# Patient Record
Sex: FEMALE | Race: BLACK OR AFRICAN AMERICAN | URBAN - METROPOLITAN AREA
[De-identification: names, ages, dates, MRNs, and addresses within clinical notes are randomized per-mention and may not be internally consistent; named-entity substitution may affect disease eponyms.]

---

## 2018-06-04 ENCOUNTER — OFFICE VISIT (OUTPATIENT)
Dept: FAMILY MEDICINE CLINIC | Age: 23
End: 2018-06-04

## 2018-06-04 VITALS
DIASTOLIC BLOOD PRESSURE: 89 MMHG | SYSTOLIC BLOOD PRESSURE: 135 MMHG | HEART RATE: 63 BPM | TEMPERATURE: 98.8 F | HEIGHT: 69 IN | WEIGHT: 188.8 LBS | RESPIRATION RATE: 18 BRPM | OXYGEN SATURATION: 100 % | BODY MASS INDEX: 27.96 KG/M2

## 2018-06-04 DIAGNOSIS — R73.09 ELEVATED HEMOGLOBIN A1C: ICD-10-CM

## 2018-06-04 DIAGNOSIS — R03.0 ELEVATED BLOOD PRESSURE READING: ICD-10-CM

## 2018-06-04 DIAGNOSIS — Z76.89 ESTABLISHING CARE WITH NEW DOCTOR, ENCOUNTER FOR: Primary | ICD-10-CM

## 2018-06-04 DIAGNOSIS — E66.3 OVERWEIGHT (BMI 25.0-29.9): ICD-10-CM

## 2018-06-04 DIAGNOSIS — Z00.00 WELL WOMAN EXAM (NO GYNECOLOGICAL EXAM): ICD-10-CM

## 2018-06-04 NOTE — PATIENT INSTRUCTIONS

## 2018-06-04 NOTE — PROGRESS NOTES
Chief Complaint   Patient presents with   Albin Barragan Establish Care     Patient here to establish care with provider. 1. Have you been to the ER, urgent care clinic since your last visit? Hospitalized since your last visit? Yes When: Last week [facility], hurt foot    2. Have you seen or consulted any other health care providers outside of the [facility] since your last visit? Include any pap smears or colon screening.  No

## 2018-06-04 NOTE — PROGRESS NOTES
[facility] Note      Subjective:     Chief Complaint   Patient presents with   [address]     Patient here to establish care with provider. Jay Lomax is a 21y.o. year old female who presents for evaluation of the following:    Establishment of Care:  Previous PCP: Dr. Jeanne Ryan (states she would like to change PCP but no reason given)  Care Team:   Dentist- seen last > 1 year ago, Rojas Plunkett appt this month  Optho- seen last 2017  GYN: Dr. Maximo Anderson  Allergist: Dr. Buck Langford    PMH:  Asthma/ Perineal Allergeis: Tx: daily inhaler used daily + albuterol + allegera + Epipen  Albuterol use- non in past 2 weeks  Hospitalized in childhood  No intubation  Managed by allergy specialist.     Heavy Periods/ Contraception  Tx: OCP  Improved. 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 Non smoker  Prevnar @65: Not due  Shingles @60: Not due    Colonoscopy @50: No family history   ASA @ 55f and 45m:  Not due. Dexa @65: Not due    HIV or other STD testing: Declined, done with GYN  Domestic Violence Screen: negative  Depression Screen: Denies depression or anhedonia       Pap:  Last 4/2018, NIL with GYN  Mammogram: No family history  Patient's last menstrual period was 05/30/2018 (exact date). Menses Monthly. No recent worsening bleeding or intermenstrual bleeding   reports that she currently engages in sexual activity and has had male partners. She reports using the following method of birth control/protection: Pill. Review of Systems   Pertinent positives and negative per HPI.  All other systems  reviewed are negative for a Comprehensive ROS (10+). Past Medical History:   Diagnosis Date    Asthma         Social History     Social History    Marital status: SINGLE     Spouse name: N/A    Number of children: N/A    Years of education: N/A     Occupational History    Not on file. Social History Main Topics    Smoking status: Never Smoker    Smokeless tobacco: Never Used    Alcohol use 0.0 oz/week     0 Standard drinks or equivalent per week      Comment: 2-4 drinks every other weekend    Drug use: No    Sexual activity: Yes     Partners: Male     Birth control/ protection: Pill     Other Topics Concern    Not on file     Social History Narrative       Current Outpatient Prescriptions   Medication Sig    fluticasone propionate (FLONASE NA) by Nasal route.  fexofenadine (ALLEGRA) 180 mg tablet Take 1 Tab by mouth nightly.  GIANVI, 28, 3-0.02 mg per tablet Take 1 Tab by mouth daily.  albuterol (PROAIR HFA) 90 mcg/actuation inhaler Take 2 Puffs by inhalation every four (4) hours as needed for Wheezing. No current facility-administered medications for this visit. Objective:     Vitals:    06/04/18 1511 06/04/18 1543   BP: (!) 150/95 135/89   Pulse: 66 63   Resp: 18    Temp: 98.8 °F (37.1 °C)    TempSrc: Oral    SpO2: 100%    Weight: 188 lb 12.8 oz (85.6 kg)    Height: 5' 8.5\" (1.74 m)        Physical Examination:  General: Alert, cooperative, no distress, appears stated age. Eyes: Conjunctivae clear. PERRL, EOMs intact. Ears: Normal external ear canals both ears. TM clear and mobile bilaterally  Nose: Nares normal. Septum midline. Mucosa normal. No drainage or sinus tenderness. Mouth/Throat: Lips, mucosa, and tongue normal. Teeth and gums normal.  Neck: Supple, symmetrical, trachea midline, no adenopathy. No thyroid enlargement/tenderness/nodules  Back: Symmetric, no curvature. ROM normal. No CVA tenderness. Lungs: Clear to auscultation bilaterally.  Normal inspiratory and expiratory ratio. Heart: Regular rate and rhythm, S1, S2 normal, no murmur, click, rub or gallop. Abdomen: Soft, non-tender. Bowel sounds normal. No masses or organomegaly. Extremities: Extremities normal, atraumatic, no cyanosis or edema. Pulses: 2+ and symmetric all extremities. Skin: Skin color, texture, turgor normal. No rashes or lesions on exposed skin. Lymph nodes: Cervical, supraclavicular nodes normal.  Neurologic: CNII-XII intact. Strength 5/5 grossly. Sensation and reflexes normal throughout. No visits with results within 3 Month(s) from this visit. Latest known visit with results is:    Office Visit on 06/16/2015   Component Date Value Ref Range Status    WBC 06/16/2015 4.5  3.4 - 10.8 x10E3/uL Final    RBC 06/16/2015 4.16  3.77 - 5.28 x10E6/uL Final    HGB 06/16/2015 11.3  11.1 - 15.9 g/dL Final    HCT 06/16/2015 34.1  34.0 - 46.6 % Final    MCV 06/16/2015 82  79 - 97 fL Final    MCH 06/16/2015 27.2  26.6 - 33.0 pg Final    MCHC 06/16/2015 33.1  31.5 - 35.7 g/dL Final    RDW 06/16/2015 13.7  12.3 - 15.4 % Final    PLATELET 50/54/0710 174  150 - 379 x10E3/uL Final    NEUTROPHILS 06/16/2015 49  % Final    Lymphocytes 06/16/2015 43  % Final    MONOCYTES 06/16/2015 6  % Final    EOSINOPHILS 06/16/2015 2  % Final    BASOPHILS 06/16/2015 0  % Final    ABS. NEUTROPHILS 06/16/2015 2.2  1.4 - 7.0 x10E3/uL Final    Abs Lymphocytes 06/16/2015 1.9  0.7 - 3.1 x10E3/uL Final    ABS. MONOCYTES 06/16/2015 0.3  0.1 - 0.9 x10E3/uL Final    ABS. EOSINOPHILS 06/16/2015 0.1  0.0 - 0.4 x10E3/uL Final    ABS. BASOPHILS 06/16/2015 0.0  0.0 - 0.2 x10E3/uL Final    IMMATURE GRANULOCYTES 06/16/2015 0  % Final    ABS. IMM.  GRANS. 06/16/2015 0.0  0.0 - 0.1 x10E3/uL Final    Glucose 06/16/2015 73  65 - 99 mg/dL Final    BUN 06/16/2015 8  6 - 20 mg/dL Final    Creatinine 06/16/2015 1.01* 0.57 - 1.00 mg/dL Final    GFR est non-AA 06/16/2015 80  >59 mL/min/1.73 Final    GFR est AA 06/16/2015 93  >59 mL/min/1.73 Final    BUN/Creatinine ratio 06/16/2015 8  8 - 20 Final    Sodium 06/16/2015 144  134 - 144 mmol/L Final    Potassium 06/16/2015 4.1  3.5 - 5.2 mmol/L Final    Chloride 06/16/2015 105  97 - 108 mmol/L Final    CO2 06/16/2015 22  18 - 29 mmol/L Final    Calcium 06/16/2015 9.2  8.7 - 10.2 mg/dL Final    Protein, total 06/16/2015 6.8  6.0 - 8.5 g/dL Final    Albumin 06/16/2015 4.3  3.5 - 5.5 g/dL Final    GLOBULIN, TOTAL 06/16/2015 2.5  1.5 - 4.5 g/dL Final    A-G Ratio 06/16/2015 1.7  1.1 - 2.5 Final    Bilirubin, total 06/16/2015 0.2  0.0 - 1.2 mg/dL Final    Alk. phosphatase 06/16/2015 41  39 - 117 IU/L Final    AST (SGOT) 06/16/2015 12  0 - 40 IU/L Final    ALT (SGPT) 06/16/2015 8  0 - 32 IU/L Final    Hemoglobin A1c 06/16/2015 5.7* 4.8 - 5.6 % Final    Comment:          Increased risk for diabetes: 5.7 - 6.4           Diabetes: >6.4           Glycemic control for adults with diabetes: <7.0      TSH 06/16/2015 1.610  0.450 - 4.500 uIU/mL Final    Specific Gravity 06/16/2015 1.015  1.005 - 1.030 Final    pH (UA) 06/16/2015 6.5  5.0 - 7.5 Final    Color 06/16/2015 Yellow  Yellow Final    Appearance 06/16/2015 Clear  Clear Final    Leukocyte Esterase 06/16/2015 3+* Negative Final    Protein 06/16/2015 Negative  Negative/Trace Final    Glucose 06/16/2015 Negative  Negative Final    Ketone 06/16/2015 Negative  Negative Final    Blood 06/16/2015 Negative  Negative Final    Bilirubin 06/16/2015 Negative  Negative Final    Urobilinogen 06/16/2015 0.2  0.0 - 1.9 mg/dL Final    Nitrites 06/16/2015 Negative  Negative Final    Microscopic Examination 06/16/2015 See additional order   Final    Microscopic was indicated and was performed.  WBC 06/16/2015 11-30* 0 - 5 /hpf Final    RBC 06/16/2015 0-2  0 - 2 /hpf Final    Epithelial cells 06/16/2015 0-10  0 - 10 /hpf Final    Mucus 06/16/2015 Present  Not Estab.  Final    Bacteria 06/16/2015 Few  None seen/Few Final           Assessment/ Plan:   Diagnoses and all orders for this visit:    1. Establishing care with new doctor, encounter for    2. Well woman exam (no gynecological exam)  -     METABOLIC PANEL, COMPREHENSIVE  -     CBC WITH AUTOMATED DIFF    3. Overweight (BMI 25.0-29.9)    4. Moderate intermittent asthma without complication    5. Elevated hemoglobin A1c  -     HEMOGLOBIN A1C WITH EAG    6. Elevated blood pressure reading      Previous epic records reviewed. Allergies records requested. Doing well overall  Abnormal findings discussed below. STI screen declined. Labs to eval end organ function today  No Tdap on file but patient reports vaccines up to date  Pap with GYN  Diet and lifestyle modification encouraged for weight loss. Noted 27 pound weight gain since last visit 2 years ago. Moderate intermittent asthma per history but new daily inhaler (name unknown) suggestive of persistent asthma. Specialist records requested for clarity. Noted previous hemoglobin A1c 5.7% in 2015 suggestive of prediabetes. Monitor with repeat labs today. Noted initial elevated blood pressure reading. Repeat less than 140/90. Noted with family history and mother have hypertension patient at high risk of developing hypertension. Low salt diet encouraged (less tacos)       I have discussed the diagnosis with the patient and the intended plan as seen in the above orders. The patient has received an after-visit summary and questions were answered concerning future plans. I have discussed medication side effects and warnings with the patient as well. Follow-up Disposition:  Return in about 6 months (around 12/4/2018) for Follow Up.       Signed,    Fernando Fontaine MD  6/4/2018

## 2018-06-04 NOTE — MR AVS SNAPSHOT
[address] [place] [address] 
[phone] Patient: Preston San MRN: [id number] SXI:0/5/5202 Visit Information Date & Time Provider Department Dept. Phone Encounter #  
 6/4/2018  3:00 PM Sarthak Lopes  [facility] [phone] [id number] Follow-up Instructions Return in about 6 months (around 12/4/2018) for Follow Up. Upcoming Health Maintenance Date Due DTaP/Tdap/Td series (7 - Td) 7/12/2016 Influenza Age 5 to Adult 8/1/2018 PAP AKA CERVICAL CYTOLOGY 4/30/2021 Allergies as of 6/4/2018  Review Complete On: 6/4/2018 By: Mercedes Mena LPN No Known Allergies Current Immunizations  Reviewed on 5/30/2018 Name Date DTaP 4/7/1999, 11/26/1996, 1995, 1995, 1995 HPV 5/28/2010, 3/8/2010, 7/28/2009 Hep A Vaccine 5/28/2010, 4/20/2009 Hep B Vaccine 1/11/1996, 1995, 1995 Hib 4/9/1996, 1995, 1995, 1995 Influenza Vaccine 9/20/2014, 9/24/2012, 1/18/2012, 10/19/2009, 11/6/2008, 10/13/2007, 10/18/2005, 10/7/2004, 11/26/2003, 10/27/2003, 10/13/2001 MMR 4/7/1999, 10/15/1996 Meningococcal ACWY Vaccine 5/17/2012, 7/16/2007 OPV 4/7/1999, 1995, 1995, 1995 Pertussis Vaccine 7/12/2006 Pneumococcal Polysaccharide (PPSV-23) 7/2/2015 TB Skin Test (PPD) 1/16/2012, 4/25/2005, 4/13/2000, 9/7/1999 Td 7/12/2006 Tdap 7/12/2006 Varicella Virus Vaccine 5/23/2008, 5/23/2005, 10/15/1996 Not reviewed this visit You Were Diagnosed With   
  
 Codes Comments Establishing care with new doctor, encounter for    -  Primary ICD-10-CM: Z76.89 
ICD-9-CM: V65.8 Overweight (BMI 25.0-29. 9)     ICD-10-CM: O30.9 ICD-9-CM: 278.02 Well woman exam (no gynecological exam)     ICD-10-CM: Z00.00 ICD-9-CM: V70.0 Elevated hemoglobin A1c     ICD-10-CM: R73.09 
ICD-9-CM: 790.29 Vitals BP Pulse Temp Resp Height(growth percentile) Weight(growth percentile) (!) 150/95 (BP 1 Location: Left arm, BP Patient Position: Sitting) 66 98.8 °F (37.1 °C) (Oral) 18 5' 8.5\" (1.74 m) 188 lb 12.8 oz (85.6 kg) LMP SpO2 BMI OB Status Smoking Status 05/30/2018 (Exact Date) 100% 28.29 kg/m2 Having regular periods Never Smoker BMI and BSA Data Body Mass Index Body Surface Area  
 28.29 kg/m 2 2.03 m 2 Preferred Pharmacy Pharmacy Name Phone [facility] [address], [address] [phone] Your Updated Medication List  
  
   
This list is accurate as of 6/4/18  3:32 PM.  Always use your most recent med list.  
  
  
  
  
 fexofenadine 180 mg tablet Commonly known as:  Bria Chick Take 1 Tab by mouth nightly. FLONASE NA  
by Nasal route. GIANVI (28) 3-0.02 mg Tab Generic drug:  drospirenone-ethinyl estradiol Take 1 Tab by mouth daily. PROAIR HFA 90 mcg/actuation inhaler Generic drug:  albuterol Take 2 Puffs by inhalation every four (4) hours as needed for Wheezing. We Performed the Following CBC WITH AUTOMATED DIFF [31687 CPT(R)] HEMOGLOBIN A1C WITH EAG [33426 CPT(R)] METABOLIC PANEL, COMPREHENSIVE [95053 CPT(R)] Follow-up Instructions Return in about 6 months (around 12/4/2018) for Follow Up. Patient Instructions Well Visit, Ages 25 to 48: Care Instructions Your Care Instructions Physical exams can help you stay healthy. Your doctor has checked your overall health and may have suggested ways to take good care of yourself. He or she also may have recommended tests. At home, you can help prevent illness with healthy eating, regular exercise, and other steps. Follow-up care is a key part of your treatment and safety.  Be sure to make and go to all appointments, and call your doctor if you are having problems. It's also a good idea to know your test results and keep a list of the medicines you take. How can you care for yourself at home? · Reach and stay at a healthy weight. This will lower your risk for many problems, such as obesity, diabetes, heart disease, and high blood pressure. · Get at least 30 minutes of physical activity on most days of the week. Walking is a good choice. You also may want to do other activities, such as running, swimming, cycling, or playing tennis or team sports. Discuss any changes in your exercise program with your doctor. · Do not smoke or allow others to smoke around you. If you need help quitting, talk to your doctor about stop-smoking programs and medicines. These can increase your chances of quitting for good. · Talk to your doctor about whether you have any risk factors for sexually transmitted infections (STIs). Having one sex partner (who does not have STIs and does not have sex with anyone else) is a good way to avoid these infections. · Use birth control if you do not want to have children at this time. Talk with your doctor about the choices available and what might be best for you. · Protect your skin from too much sun. When you're outdoors from 10 a.m. to 4 p.m., stay in the shade or cover up with clothing and a hat with a wide brim. Wear sunglasses that block UV rays. Even when it's cloudy, put broad-spectrum sunscreen (SPF 30 or higher) on any exposed skin. · See a dentist one or two times a year for checkups and to have your teeth cleaned. · Wear a seat belt in the car. · Drink alcohol in moderation, if at all. That means no more than 2 drinks a day for men and 1 drink a day for women. Follow your doctor's advice about when to have certain tests. These tests can spot problems early. For everyone · Cholesterol. Have the fat (cholesterol) in your blood tested after age 21.  Your doctor will tell you how often to have this done based on your age, family history, or other things that can increase your risk for heart disease. · Blood pressure. Have your blood pressure checked during a routine doctor visit. Your doctor will tell you how often to check your blood pressure based on your age, your blood pressure results, and other factors. · Vision. Talk with your doctor about how often to have a glaucoma test. 
· Diabetes. Ask your doctor whether you should have tests for diabetes. · Colon cancer. Have a test for colon cancer at age 48. You may have one of several tests. If you are younger than 48, you may need a test earlier if you have any risk factors. Risk factors include whether you already had a precancerous polyp removed from your colon or whether your parent, brother, sister, or child has had colon cancer. For women · Breast exam and mammogram. Talk to your doctor about when you should have a clinical breast exam and a mammogram. Medical experts differ on whether and how often women under 50 should have these tests. Your doctor can help you decide what is right for you. · Pap test and pelvic exam. Begin Pap tests at age 24. A Pap test is the best way to find cervical cancer. The test often is part of a pelvic exam. Ask how often to have this test. 
· Tests for sexually transmitted infections (STIs). Ask whether you should have tests for STIs. You may be at risk if you have sex with more than one person, especially if your partners do not wear condoms. For men · Tests for sexually transmitted infections (STIs). Ask whether you should have tests for STIs. You may be at risk if you have sex with more than one person, especially if you do not wear a condom. · Testicular cancer exam. Ask your doctor whether you should check your testicles regularly. · Prostate exam. Talk to your doctor about whether you should have a blood test (called a PSA test) for prostate cancer.  Experts differ on whether and when men should have this test. Some experts suggest it if you are older than 39 and are -American or have a father or brother who got prostate cancer when he was younger than 72. When should you call for help? Watch closely for changes in your health, and be sure to contact your doctor if you have any problems or symptoms that concern you. Where can you learn more? Go to http://susan-isha.info/. Enter P072 in the search box to learn more about \"Well Visit, Ages 25 to 48: Care Instructions. \" Current as of: May 12, 2017 Content Version: 11.4 © 6485-6772 "[employer]". Care instructions adapted under license by [employer] (which disclaims liability or warranty for this information). If you have questions about a medical condition or this instruction, always ask your healthcare professional. [address] any warranty or liability for your use of this information. Introducing [facility]! Dear Nixon Segura: Thank you for requesting a Mobeon account. Our records indicate that you already have an active Mobeon account. You can access your account anytime at [email or web address] Did you know that you can access your hospital and ER discharge instructions at any time in Mobeon? You can also review all of your test results from your hospital stay or ER visit. Additional Information If you have questions, please visit the Frequently Asked Questions section of the Mobeon website at [email or web address] Remember, Mobeon is NOT to be used for urgent needs. For medical emergencies, dial 911. Now available from your iPhone and Android! Please provide this summary of care documentation to your next provider. Your primary care clinician is listed as Breana Cordon. If you have any questions after today's visit, please call [phone].

## 2018-06-04 NOTE — LETTER
6/11/2018 4:58 PM 
 
Ms. Aroldo Murphy [address] [address] [facility] [address] [facility] [address] Dear Aroldo Murphy: 
 
Please find your most recent results below. Resulted Orders METABOLIC PANEL, COMPREHENSIVE Result Value Ref Range Glucose 111 (H) 65 - 99 mg/dL BUN 9 6 - 20 mg/dL Creatinine 0.71 0.57 - 1.00 mg/dL GFR est non- >59 mL/min/1.73 GFR est  >59 mL/min/1.73  
 BUN/Creatinine ratio 13 9 - 23 Sodium 141 134 - 144 mmol/L Potassium 4.1 3.5 - 5.2 mmol/L Chloride 103 96 - 106 mmol/L  
 CO2 27 18 - 29 mmol/L Comment: **Effective June 11, 2018 Carbon Dioxide, Total** 
  reference interval will be changing to: Age                  Male          Female 
     0 days   - 30 days         16 - 34        16 - 34 
    31 days   -  1 year         15 - 22        15 - 25 
     2 years  -  5 years        16 - 34        14 - 32 
     6 years  - 15 years        23 - 32        22 - 32 
               >12 years        21 - 32        18 - 34 Calcium 9.1 8.7 - 10.2 mg/dL Protein, total 7.2 6.0 - 8.5 g/dL Albumin 4.2 3.5 - 5.5 g/dL GLOBULIN, TOTAL 3.0 1.5 - 4.5 g/dL A-G Ratio 1.4 1.2 - 2.2 Bilirubin, total <0.2 0.0 - 1.2 mg/dL Alk. phosphatase 48 39 - 117 IU/L  
 AST (SGOT) 19 0 - 40 IU/L  
 ALT (SGPT) 14 0 - 32 IU/L Narrative Performed at:  [address]  [id number] : Sadie Flowers MD, Phone:  [phone] CBC WITH AUTOMATED DIFF Result Value Ref Range WBC 4.3 3.4 - 10.8 x10E3/uL  
 RBC 4.37 3.77 - 5.28 x10E6/uL HGB 12.2 11.1 - 15.9 g/dL HCT 36.1 34.0 - 46.6 % MCV 83 79 - 97 fL  
 MCH 27.9 26.6 - 33.0 pg  
 MCHC 33.8 31.5 - 35.7 g/dL  
 RDW 13.1 12.3 - 15.4 % PLATELET 171 843 - 604 x10E3/uL NEUTROPHILS 37 Not Estab. % Lymphocytes 51 Not Estab. % MONOCYTES 4 Not Estab. % EOSINOPHILS 7 Not Estab. %  BASOPHILS 1 Not Estab. %  
 ABS. NEUTROPHILS 1.6 1.4 - 7.0 x10E3/uL Abs Lymphocytes 2.2 0.7 - 3.1 x10E3/uL  
 ABS. MONOCYTES 0.2 0.1 - 0.9 x10E3/uL  
 ABS. EOSINOPHILS 0.3 0.0 - 0.4 x10E3/uL  
 ABS. BASOPHILS 0.0 0.0 - 0.2 x10E3/uL IMMATURE GRANULOCYTES 0 Not Estab. %  
 ABS. IMM. GRANS. 0.0 0.0 - 0.1 x10E3/uL Narrative Performed at:  [address]  [id number] : Lissa Feldman MD, Phone:  [phone] HEMOGLOBIN A1C WITH EAG Result Value Ref Range Hemoglobin A1c 5.1 4.8 - 5.6 % Comment:  
            Pre-diabetes: 5.7 - 6.4 Diabetes: >6.4 Glycemic control for adults with diabetes: <7.0 Estimated average glucose 100 mg/dL Narrative Performed at:  [address]  [id number] : Lissa Feldman MD, Phone:  [phone] RECOMMENDATIONS: 
All of your test results look good. Please call me if you have any questions: [phone] Sincerely, Marlyn Yousif MD

## 2018-06-05 LAB
ALBUMIN SERPL-MCNC: 4.2 G/DL (ref 3.5–5.5)
ALBUMIN/GLOB SERPL: 1.4 {RATIO} (ref 1.2–2.2)
ALP SERPL-CCNC: 48 IU/L (ref 39–117)
ALT SERPL-CCNC: 14 IU/L (ref 0–32)
AST SERPL-CCNC: 19 IU/L (ref 0–40)
BASOPHILS # BLD AUTO: 0 X10E3/UL (ref 0–0.2)
BASOPHILS NFR BLD AUTO: 1 %
BILIRUB SERPL-MCNC: <0.2 MG/DL (ref 0–1.2)
BUN SERPL-MCNC: 9 MG/DL (ref 6–20)
BUN/CREAT SERPL: 13 (ref 9–23)
CALCIUM SERPL-MCNC: 9.1 MG/DL (ref 8.7–10.2)
CHLORIDE SERPL-SCNC: 103 MMOL/L (ref 96–106)
CO2 SERPL-SCNC: 27 MMOL/L (ref 18–29)
CREAT SERPL-MCNC: 0.71 MG/DL (ref 0.57–1)
EOSINOPHIL # BLD AUTO: 0.3 X10E3/UL (ref 0–0.4)
EOSINOPHIL NFR BLD AUTO: 7 %
ERYTHROCYTE [DISTWIDTH] IN BLOOD BY AUTOMATED COUNT: 13.1 % (ref 12.3–15.4)
EST. AVERAGE GLUCOSE BLD GHB EST-MCNC: 100 MG/DL
GFR SERPLBLD CREATININE-BSD FMLA CKD-EPI: 120 ML/MIN/1.73
GFR SERPLBLD CREATININE-BSD FMLA CKD-EPI: 139 ML/MIN/1.73
GLOBULIN SER CALC-MCNC: 3 G/DL (ref 1.5–4.5)
GLUCOSE SERPL-MCNC: 111 MG/DL (ref 65–99)
HBA1C MFR BLD: 5.1 % (ref 4.8–5.6)
HCT VFR BLD AUTO: 36.1 % (ref 34–46.6)
HGB BLD-MCNC: 12.2 G/DL (ref 11.1–15.9)
IMM GRANULOCYTES # BLD: 0 X10E3/UL (ref 0–0.1)
IMM GRANULOCYTES NFR BLD: 0 %
LYMPHOCYTES # BLD AUTO: 2.2 X10E3/UL (ref 0.7–3.1)
LYMPHOCYTES NFR BLD AUTO: 51 %
MCH RBC QN AUTO: 27.9 PG (ref 26.6–33)
MCHC RBC AUTO-ENTMCNC: 33.8 G/DL (ref 31.5–35.7)
MCV RBC AUTO: 83 FL (ref 79–97)
MONOCYTES # BLD AUTO: 0.2 X10E3/UL (ref 0.1–0.9)
MONOCYTES NFR BLD AUTO: 4 %
NEUTROPHILS # BLD AUTO: 1.6 X10E3/UL (ref 1.4–7)
NEUTROPHILS NFR BLD AUTO: 37 %
PLATELET # BLD AUTO: 343 X10E3/UL (ref 150–379)
POTASSIUM SERPL-SCNC: 4.1 MMOL/L (ref 3.5–5.2)
PROT SERPL-MCNC: 7.2 G/DL (ref 6–8.5)
RBC # BLD AUTO: 4.37 X10E6/UL (ref 3.77–5.28)
SODIUM SERPL-SCNC: 141 MMOL/L (ref 134–144)
WBC # BLD AUTO: 4.3 X10E3/UL (ref 3.4–10.8)

## 2018-06-11 NOTE — PROGRESS NOTES
Unable to reach patient via telephone. 3rd attempt. Phone always rings busy. Letter printed with results.

## 2018-10-29 ENCOUNTER — CLINICAL SUPPORT (OUTPATIENT)
Dept: FAMILY MEDICINE CLINIC | Age: 23
End: 2018-10-29

## 2018-10-29 DIAGNOSIS — Z23 ENCOUNTER FOR IMMUNIZATION: Primary | ICD-10-CM

## 2018-10-29 NOTE — PROGRESS NOTES
Chief Complaint   Patient presents with    Immunization/Injection     1. Have you been to the ER, urgent care clinic since your last visit? Hospitalized since your last visit? No    2. Have you seen or consulted any other health care providers outside of the [address] [place] [address] since your last visit? Include any pap smears or colon screening.  Yes    Dr. Darwin Urena allergist 10/3/18 approximately

## 2018-10-31 ENCOUNTER — OFFICE VISIT (OUTPATIENT)
Dept: FAMILY MEDICINE CLINIC | Age: 23
End: 2018-10-31

## 2018-10-31 VITALS
TEMPERATURE: 98.5 F | RESPIRATION RATE: 18 BRPM | OXYGEN SATURATION: 100 % | BODY MASS INDEX: 29 KG/M2 | HEIGHT: 69 IN | HEART RATE: 82 BPM | DIASTOLIC BLOOD PRESSURE: 72 MMHG | WEIGHT: 195.8 LBS | SYSTOLIC BLOOD PRESSURE: 118 MMHG

## 2018-10-31 DIAGNOSIS — E66.3 OVERWEIGHT (BMI 25.0-29.9): ICD-10-CM

## 2018-10-31 DIAGNOSIS — Z11.1 SCREENING FOR TUBERCULOSIS: Primary | ICD-10-CM

## 2018-10-31 DIAGNOSIS — L20.82 FLEXURAL ECZEMA: ICD-10-CM

## 2018-10-31 DIAGNOSIS — R03.0 ELEVATED BLOOD PRESSURE READING: ICD-10-CM

## 2018-10-31 DIAGNOSIS — J30.9 ALLERGIC RHINITIS, UNSPECIFIED SEASONALITY, UNSPECIFIED TRIGGER: ICD-10-CM

## 2018-10-31 RX ORDER — TRIAMCINOLONE ACETONIDE 1 MG/G
CREAM TOPICAL 2 TIMES DAILY
Qty: 15 G | Refills: 0 | Status: SHIPPED | OUTPATIENT
Start: 2018-10-31

## 2018-10-31 NOTE — PATIENT INSTRUCTIONS
Weight Loss Tips:    Remember this is like a part time job so your motivation and commitment is key to your success. Use small plate only  Drink 2 liters (1/2 gallon) of water every day  1/2 of every meal is fruit or vegetable  Try meal prepping on Sunday with new different vegetables. Replace soda with diet soda or other zero sugar drinks (selter water just fine)  Start using the [employer] [facility] breanne for calorie counting. Goal 1800 calories per day  Start work out at least 5 days per week for 40 minutes. Consider GFRANQ training breanne for home exercises. For your symptoms: Your symptoms may improve with an oral antihistamine. These are available over the counter and include:  Loratadine/claritin  Cetirizine/Zyrtec  Fexofenadine/Allegra  Levocetirizine/Xyzal    · Your symptoms may improve with a nasal steroid. These are available over the counter and include:  · Flonase (aka fluticasone)  · Nasocort (aka triamcinolone)  · Nasonex (aka mometasone)  · Rhinocort (aka budesonide)    · Increase fluid intake, especially water to thin mucous and boost the immune system. · Avoid sugar and dairy while congested since they thicken mucous. · Get plenty of rest!    · Gargle 3 times daily and as needed in Listerine or warm salt water vinegar solutions (1 tsp salt, 1 tsp vinegar in 1 cup lukewarm water.)    · Use OTC nasal saline spray up each nostril four times daily. You could also consider using a netipot with distilled water. · Use humidifier at bedtime. · Use OTC Mucinex 600 mg twice daily to loosen mucous. · Use OTC Tylenol  (up to 650mg every 6 hours) or Ibuprofen(up to 800 mg every 8 hours) as needed for pain, fever or headaches. ·  Avoid decongestants and Ibuprofen if you have high blood pressure!       Return to the doctor for evaluation:  · If mucous is consistently discolored yellow or green throughout the day for more than a week  · If you develop worsening facial pain  · If you develop a fever that will not go away  · If your symptoms worsen instead of improve         Saline Nasal Washes: Care Instructions  Your Care Instructions  Saline nasal washes help keep the nasal passages open by washing out thick or dried mucus. This simple remedy can help relieve symptoms of allergies, sinusitis, and colds. It also can make the nose feel more comfortable by keeping the mucous membranes moist. You may notice a little burning sensation in your nose the first few times you use the solution, but this usually gets better in a few days. Follow-up care is a key part of your treatment and safety. Be sure to make and go to all appointments, and call your doctor if you are having problems. It's also a good idea to know your test results and keep a list of the medicines you take. How can you care for yourself at home? · You can buy premixed saline solution in a squeeze bottle or other sinus rinse products at a drugstore. Read and follow the instructions on the label. · You also can make your own saline solution by adding 1 teaspoon of salt and 1 teaspoon of baking soda to 2 cups of distilled water. · If you use a homemade solution, pour a small amount into a clean bowl. Using a rubber bulb syringe, squeeze the syringe and place the tip in the salt water. Pull a small amount of the salt water into the syringe by relaxing your hand. · Sit down with your head tilted slightly back. Do not lie down. Put the tip of the bulb syringe or the squeeze bottle a little way into one of your nostrils. Gently drip or squirt a few drops into the nostril. Repeat with the other nostril. Some sneezing and gagging are normal at first.  · Gently blow your nose. · Wipe the syringe or bottle tip clean after each use. · Repeat this 2 or 3 times a day. · Use nasal washes gently if you have nosebleeds often. When should you call for help?   Watch closely for changes in your health, and be sure to contact your doctor if:    · You often get nosebleeds.     · You have problems doing the nasal washes. Where can you learn more? Go to http://susan-isha.info/. Enter [phone] in the search box to learn more about \"Saline Nasal Washes: Care Instructions. \"  Current as of: March 28, 2018  Content Version: 11.8  © 1566-5563 [email or web address]. Care instructions adapted under license by [employer] (which disclaims liability or warranty for this information). If you have questions about a medical condition or this instruction, always ask your healthcare professional. [address] any warranty or liability for your use of this information.

## 2018-10-31 NOTE — PROGRESS NOTES
[facility] Note      Subjective:     Chief Complaint   Patient presents with    Complete Physical     Complete Physical for nursing school     Angy Gallardo is a 21y.o. 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 Patient does not remember date for more recent Tdap  Needs 2 step PPD testing per nursing form  Denies cough, hemoptysis, fever, night seats, known exposure to people with TB, working with refugee population    Overweight:  Gained 7 lbs since last visit   Has used prednisone recently  Diet: recently tried to cut down on portions, no fired foods    Eczema of Neck  Chronic  Not imprved over-the-counter hydrocortisone cream    Review of Systems   Pertinent positives and negative per HPI. All other systems  reviewed are negative for a Comprehensive ROS (10+). Past Medical History:   Diagnosis Date    Asthma         Social History     Socioeconomic History    Marital status: SINGLE     Spouse name: Not on file    Number of children: Not on file    Years of education: Not on file    Highest education level: Not on file   Social Needs    Financial resource strain: Not on file    Food insecurity - worry: Not on file    Food insecurity - inability: Not on file    Transportation needs - medical: Not on file   [employer] needs - non-medical: Not on file   Occupational History    Not on file   Tobacco Use    Smoking status: Never Smoker    Smokeless tobacco: Never Used   Substance and Sexual Activity    Alcohol use:  Yes     Alcohol/week: 0.0 oz     Comment: 2-4 drinks every other weekend    Drug use: No    Sexual activity: Yes     Partners: Male Birth control/protection: Pill   Other Topics Concern    Not on file   Social History Narrative    Not on file       Current Outpatient Medications   Medication Sig    fluticasone propionate (FLONASE NA) by Nasal route.  fexofenadine (ALLEGRA) 180 mg tablet Take 1 Tab by mouth nightly.  GIANVI, 28, 3-0.02 mg per tablet Take 1 Tab by mouth daily.  albuterol (PROAIR HFA) 90 mcg/actuation inhaler Take 2 Puffs by inhalation every four (4) hours as needed for Wheezing. No current facility-administered medications for this visit. Objective:     Vitals:    10/31/18 0938   BP: (!) 128/91   Pulse: 82   Resp: 18   Temp: 98.5 °F (36.9 °C)   TempSrc: Oral   SpO2: 100%   Weight: 195 lb 12.8 oz (88.8 kg)   Height: 5' 8.5\" (1.74 m)       Physical Examination:  General: Alert, cooperative, no distress, appears stated age. Eyes: Conjunctivae clear. PERRL, EOMs intact. Ears: Normal external ear canals both ears. Nose: Nares normal. Septum midline. Mucosa normal. No drainage or sinus tenderness. Mouth/Throat: Lips, mucosa, and tongue normal.   Neck: Supple, symmetrical, trachea midline, no adenopathy. No thyroid enlargement/tenderness/nodules  Back: Symmetric, no curvature. Lungs: Clear to auscultation bilaterally. Normal inspiratory and expiratory ratio. Heart: Regular rate and rhythm, S1, S2 normal, no murmur, click, rub or gallop. Abdomen: Soft, non-tender. Extremities: Extremities normal, atraumatic, no cyanosis or edema. Pulses: 2+ and symmetric all extremities. Skin: Two ~1 cm hyperkeratotic plaques on posterior neck, with excoriations. Lymph nodes: Cervical, supraclavicular nodes normal.  Neurologic: CNII-XII intact. No visits with results within 3 Month(s) from this visit.    Latest known visit with results is:   Office Visit on 06/04/2018   Component Date Value Ref Range Status    Glucose 06/04/2018 111* 65 - 99 mg/dL Final    BUN 06/04/2018 9  6 - 20 mg/dL Final    Creatinine 06/04/2018 0.71  0.57 - 1.00 mg/dL Final    GFR est non-AA 06/04/2018 120  >59 mL/min/1.73 Final    GFR est AA 06/04/2018 139  >59 mL/min/1.73 Final    BUN/Creatinine ratio 06/04/2018 13  9 - 23 Final    Sodium 06/04/2018 141  134 - 144 mmol/L Final    Potassium 06/04/2018 4.1  3.5 - 5.2 mmol/L Final    Chloride 06/04/2018 103  96 - 106 mmol/L Final    CO2 06/04/2018 27  18 - 29 mmol/L Final    Comment: **Effective June 11, 2018 Carbon Dioxide, Total**    reference interval will be changing to: Age                  Male          Female       0 days   - 30 days         12 - 34        16 - 34      31 days   -  1 year         15 - 22        15 - 25       2 years  -  5 years        16 - 34        14 - 32       6 years  - 12 years        23 - 32        22 - 32                 >12 years        21 - 34        20 - 34      Calcium 06/04/2018 9.1  8.7 - 10.2 mg/dL Final    Protein, total 06/04/2018 7.2  6.0 - 8.5 g/dL Final    Albumin 06/04/2018 4.2  3.5 - 5.5 g/dL Final    GLOBULIN, TOTAL 06/04/2018 3.0  1.5 - 4.5 g/dL Final    A-G Ratio 06/04/2018 1.4  1.2 - 2.2 Final    Bilirubin, total 06/04/2018 <0.2  0.0 - 1.2 mg/dL Final    Alk.  phosphatase 06/04/2018 48  39 - 117 IU/L Final    AST (SGOT) 06/04/2018 19  0 - 40 IU/L Final    ALT (SGPT) 06/04/2018 14  0 - 32 IU/L Final    WBC 06/04/2018 4.3  3.4 - 10.8 x10E3/uL Final    RBC 06/04/2018 4.37  3.77 - 5.28 x10E6/uL Final    HGB 06/04/2018 12.2  11.1 - 15.9 g/dL Final    HCT 06/04/2018 36.1  34.0 - 46.6 % Final    MCV 06/04/2018 83  79 - 97 fL Final    MCH 06/04/2018 27.9  26.6 - 33.0 pg Final    MCHC 06/04/2018 33.8  31.5 - 35.7 g/dL Final    RDW 06/04/2018 13.1  12.3 - 15.4 % Final    PLATELET 64/88/7952 357  150 - 379 x10E3/uL Final    NEUTROPHILS 06/04/2018 37  Not Estab. % Final    Lymphocytes 06/04/2018 51  Not Estab. % Final    MONOCYTES 06/04/2018 4  Not Estab. % Final    EOSINOPHILS 06/04/2018 7  Not Estab. % Final    BASOPHILS 06/04/2018 1  Not Estab. % Final    ABS. NEUTROPHILS 06/04/2018 1.6  1.4 - 7.0 x10E3/uL Final    Abs Lymphocytes 06/04/2018 2.2  0.7 - 3.1 x10E3/uL Final    ABS. MONOCYTES 06/04/2018 0.2  0.1 - 0.9 x10E3/uL Final    ABS. EOSINOPHILS 06/04/2018 0.3  0.0 - 0.4 x10E3/uL Final    ABS. BASOPHILS 06/04/2018 0.0  0.0 - 0.2 x10E3/uL Final    IMMATURE GRANULOCYTES 06/04/2018 0  Not Estab. % Final    ABS. IMM. GRANS. 06/04/2018 0.0  0.0 - 0.1 x10E3/uL Final    Hemoglobin A1c 06/04/2018 5.1  4.8 - 5.6 % Final    Comment:          Pre-diabetes: 5.7 - 6.4           Diabetes: >6.4           Glycemic control for adults with diabetes: <7.0      Estimated average glucose 06/04/2018 100  mg/dL Final           Assessment/ Plan:   Diagnoses and all orders for this visit:    1. Screening for tuberculosis  -     AMB POC TUBERCULOSIS, INTRADERMAL (SKIN TEST)    2. Flexural eczema  -     triamcinolone acetonide (KENALOG) 0.1 % topical cream; Apply  to affected area two (2) times a day. use thin layer    3. Allergic rhinitis, unspecified seasonality, unspecified trigger    4. Overweight (BMI 25.0-29.9)    5. Elevated blood pressure reading      PPD placed for TB screening. Patient to return for nursing visit to a PPD and get Tdap. Will complete school form when vaccines and PPD up-to-date. Trial triamcinolone for eczema. Allergic rhinitis, chronic. Trial switch antihistamine and nasal steroid. Also trial sinus rinse. Diet and activity modification encouraged for weight loss. Noted initial BP elevation. Advised weight loss and healthy diet. Educated patient on red flag symptoms to warrant return to clinic or emergency room visit. I have discussed the diagnosis with the patient and the intended plan as seen in the above orders. The patient has been offered or received an after-visit summary and questions were answered concerning future plans.   I have discussed medication side effects and warnings with the patient as well.            Follow-up Disposition:  Return in about 8 months (around 6/30/2019) for Physical exam.      Signed,    Salvatore Pizano MD  10/31/2018

## 2018-10-31 NOTE — PROGRESS NOTES
Chief Complaint   Patient presents with    Complete Physical     Complete Physical for nursing school     1. Have you been to the ER, urgent care clinic since your last visit? Hospitalized since your last visit? No    2. Have you seen or consulted any other health care providers outside of the [address] [place] [address] since your last visit? Include any pap smears or colon screening. No    Patient received PPD in left forearm. To be read in 48-72 hours.

## 2018-11-03 LAB
MM INDURATION POC: 0 MM (ref 0–5)
PPD POC: NORMAL NEGATIVE

## 2018-11-19 ENCOUNTER — OFFICE VISIT (OUTPATIENT)
Dept: FAMILY MEDICINE CLINIC | Age: 23
End: 2018-11-19

## 2018-11-19 ENCOUNTER — CLINICAL SUPPORT (OUTPATIENT)
Dept: FAMILY MEDICINE CLINIC | Age: 23
End: 2018-11-19

## 2018-11-19 DIAGNOSIS — Z23 ENCOUNTER FOR IMMUNIZATION: ICD-10-CM

## 2018-11-19 DIAGNOSIS — Z11.1 SCREENING EXAMINATION FOR PULMONARY TUBERCULOSIS: ICD-10-CM

## 2018-11-19 NOTE — PROGRESS NOTES
Chief Complaint   Patient presents with    Immunization/Injection     Patient in office today for tdap vaccine and 2nd step ppd      1. Have you been to the ER, urgent care clinic since your last visit? Hospitalized since your last visit? No    2. Have you seen or consulted any other health care providers outside of the [facility] since your last visit? Include any pap smears or colon screening. No     Patient received tdap vaccine today in right deltoid with no adverse reactions today.

## 2018-11-21 LAB
MM INDURATION POC: 0 MM (ref 0–5)
PPD POC: NEGATIVE NEGATIVE